# Patient Record
Sex: FEMALE
[De-identification: names, ages, dates, MRNs, and addresses within clinical notes are randomized per-mention and may not be internally consistent; named-entity substitution may affect disease eponyms.]

---
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
## 2024-01-04 ENCOUNTER — APPOINTMENT (OUTPATIENT)
Dept: ENDOCRINOLOGY | Facility: CLINIC | Age: 19
End: 2024-01-04
Payer: MEDICAID

## 2024-01-04 ENCOUNTER — NON-APPOINTMENT (OUTPATIENT)
Age: 19
End: 2024-01-04

## 2024-01-04 VITALS
TEMPERATURE: 97.3 F | BODY MASS INDEX: 17.19 KG/M2 | WEIGHT: 107 LBS | RESPIRATION RATE: 17 BRPM | SYSTOLIC BLOOD PRESSURE: 112 MMHG | HEIGHT: 66 IN | OXYGEN SATURATION: 99 % | DIASTOLIC BLOOD PRESSURE: 58 MMHG | HEART RATE: 89 BPM

## 2024-01-04 PROCEDURE — 99204 OFFICE O/P NEW MOD 45 MIN: CPT

## 2024-01-04 NOTE — PHYSICAL EXAM
[de-identified] : General: No distress, well nourished Eyes: Normal Sclera, EOMI, PERRL ENT: Normal appearance of the nose, normal oropharynx Neck/Thyroid: No cervical lymphadenopathy, thyroid gland 20 g in size, no thyroid nodules, non-tender Respiratory: No use of accessory muscles of respiration, vesicular breath sounds heard bilaterally, no crepitations or ronchi Cardiovascular: S1 and S2 heard and normal, no S3 or S4, no murmurs, radial pulse normal bilaterally Abdomen: soft, non-tender, no masses, normal bowel sounds Musculoskeletal: No swelling or deformities of joints of hands, no pedal edema Neurological: Normal range of motion in the hands, Normal brachioradialis reflexes bilaterally Psychiatry: Patient converses normally, good judgement and insight Skin: No rashes in hands, no nodules palpated in hands

## 2024-01-04 NOTE — REVIEW OF SYSTEMS
[FreeTextEntry2] : Constitutional: No Fever Eyes: No visual difficulty ENT: no difficulty hearing Cardiovascular: Has palpitations Respiratory: No Cough Gastrointestinal: No nausea Genitourinary: No dysuria Musculoskeletal: No joint pain Neurological: No headaches Psychiatry: No sleep abnormalities Skin: No rashes Hematology: No bleeding

## 2024-01-04 NOTE — HISTORY OF PRESENT ILLNESS
[FreeTextEntry1] : Problems: 1. Thyrotoxicosis  Thyrotoxicosis 1. 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 Labs: 12/13/2023 - TSH 0.24 (0.4 to 5.8), Free T4 - 2 (0.8 to 1.8), Free T3 - 4.9 N, Reverse T3 - 27 (8 to 25) TPO antibodies neg 3. No family history of thyroid disorders or thyroid cancer, no personal history of radiation treatment 4. No cardiac issues, no osteoporosis 5. Symptoms on 01/04/2024 - last 15 pounds in 2 years, has palpitations but did not have tachycardia on physical exam. Patient has a history of depression and was on SSRIs in the past 6. No Graves' orbitopathy or dermopathy 7. Meds: Patient is not on amiodarone or estrogen

## 2024-01-04 NOTE — ASSESSMENT
[FreeTextEntry1] : This patient was diagnosed with overt thyrotoxicosis in Dec 2023. I ordered labs and a thyroid US to evaluate the etiology of this.  She lost 15 pounds from 2022 to 2024 but she also has a history of depression.   Plan: 1. Labs to be done today at [address] [place] [address] - see below 2. Thyroid US 3. Follow up in 2 weeks to review results - telehealth visit ok

## 2024-01-05 LAB
ALBUMIN SERPL ELPH-MCNC: 4.8 G/DL
ALP BLD-CCNC: 74 U/L
ALT SERPL-CCNC: 12 U/L
ANION GAP SERPL CALC-SCNC: 11 MMOL/L
AST SERPL-CCNC: 17 U/L
BASOPHILS # BLD AUTO: 0.02 K/UL
BASOPHILS NFR BLD AUTO: 0.3 %
BILIRUB SERPL-MCNC: 0.3 MG/DL
BUN SERPL-MCNC: 11 MG/DL
CALCIUM SERPL-MCNC: 9.7 MG/DL
CHLORIDE SERPL-SCNC: 106 MMOL/L
CO2 SERPL-SCNC: 25 MMOL/L
CREAT SERPL-MCNC: 0.63 MG/DL
EGFR: 131 ML/MIN/1.73M2
EOSINOPHIL # BLD AUTO: 0.07 K/UL
EOSINOPHIL NFR BLD AUTO: 1.2 %
ERYTHROCYTE [SEDIMENTATION RATE] IN BLOOD BY WESTERGREN METHOD: 6 MM/HR
GLUCOSE SERPL-MCNC: 122 MG/DL
HCT VFR BLD CALC: 40.4 %
HGB BLD-MCNC: 13 G/DL
IMM GRANULOCYTES NFR BLD AUTO: 0.2 %
LYMPHOCYTES # BLD AUTO: 2.51 K/UL
LYMPHOCYTES NFR BLD AUTO: 41.8 %
MAN DIFF?: NORMAL
MCHC RBC-ENTMCNC: 30 PG
MCHC RBC-ENTMCNC: 32.2 GM/DL
MCV RBC AUTO: 93.3 FL
MONOCYTES # BLD AUTO: 0.34 K/UL
MONOCYTES NFR BLD AUTO: 5.7 %
NEUTROPHILS # BLD AUTO: 3.05 K/UL
NEUTROPHILS NFR BLD AUTO: 50.8 %
PLATELET # BLD AUTO: 249 K/UL
POTASSIUM SERPL-SCNC: 4.2 MMOL/L
PROT SERPL-MCNC: 7.2 G/DL
RBC # BLD: 4.33 M/UL
RBC # FLD: 13 %
SODIUM SERPL-SCNC: 141 MMOL/L
T3 SERPL-MCNC: 126 NG/DL
T4 FREE SERPL-MCNC: 1.3 NG/DL
TSH SERPL-ACNC: 1.05 UIU/ML
WBC # FLD AUTO: 6 K/UL

## 2024-01-06 LAB
THYROGLOB AB SERPL-ACNC: <20 IU/ML
THYROGLOB SERPL-MCNC: 31.9 NG/ML
THYROPEROXIDASE AB SERPL IA-ACNC: 18.3 IU/ML

## 2024-01-08 LAB — TSI ACT/NOR SER: <0.1 IU/L

## 2024-01-09 ENCOUNTER — APPOINTMENT (OUTPATIENT)
Dept: ULTRASOUND IMAGING | Facility: CLINIC | Age: 19
End: 2024-01-09
Payer: MEDICAID

## 2024-01-09 ENCOUNTER — OUTPATIENT (OUTPATIENT)
Dept: OUTPATIENT SERVICES | Facility: HOSPITAL | Age: 19
LOS: 1 days | End: 2024-01-09
Payer: MEDICAID

## 2024-01-09 ENCOUNTER — TRANSCRIPTION ENCOUNTER (OUTPATIENT)
Age: 19
End: 2024-01-09

## 2024-01-09 DIAGNOSIS — E05.90 THYROTOXICOSIS, UNSPECIFIED WITHOUT THYROTOXIC CRISIS OR STORM: ICD-10-CM

## 2024-01-09 PROCEDURE — 76536 US EXAM OF HEAD AND NECK: CPT

## 2024-01-09 PROCEDURE — 76536 US EXAM OF HEAD AND NECK: CPT | Mod: 26

## 2024-01-10 LAB — TSH RECEPTOR AB: <1.1 IU/L

## 2024-01-11 LAB
THYROGLOB AB SERPL-ACNC: <1 IU/ML
THYROGLOB SERPL-MCNC: 24.4 NG/ML
THYROGLOB SERPL-MCNC: NORMAL NG/ML

## 2024-01-18 ENCOUNTER — APPOINTMENT (OUTPATIENT)
Dept: ENDOCRINOLOGY | Facility: CLINIC | Age: 19
End: 2024-01-18
Payer: MEDICAID

## 2024-01-18 DIAGNOSIS — R63.4 ABNORMAL WEIGHT LOSS: ICD-10-CM

## 2024-01-18 DIAGNOSIS — E05.90 THYROTOXICOSIS, UNSPECIFIED W/OUT THYROTOXIC CRISIS OR STORM: ICD-10-CM

## 2024-01-18 PROCEDURE — 99214 OFFICE O/P EST MOD 30 MIN: CPT | Mod: 95

## 2024-01-18 NOTE — ASSESSMENT
[FreeTextEntry1] : This patient was diagnosed with overt thyrotoxicosis in Dec 2023. In Jan 2024, her TSH, Free T4 and Total T3 were normal - also in Jan 2024, TSI, TSH-r abs, TPO abs, Thyroglobulin abs and ESR were normal and thyroid US did not reveal a thyroid goiter or thyroid nodule. She is now euthyroid - Etiology of her thyrotoxicosis in Dec 2023 unclear at this time. I will monitor her thyroid function tests.  She lost 15 pounds from 2022 to 2024 but she also has a history of depression.  Plan: 1. Labs to be done in 3 months - see below 2. Follow up in 3 months to review results - telehealth visit ok.

## 2024-01-18 NOTE — REASON FOR VISIT
[Home] : at home, [unfilled] , at the time of the visit. [Medical Office: ([facility])___] : at the medical office located in  [Patient] : the patient [Follow - Up] : a follow-up visit [Other___] : [unfilled]

## 2024-01-18 NOTE — HISTORY OF PRESENT ILLNESS
[FreeTextEntry1] : Problems: 1. Thyrotoxicosis  Thyrotoxicosis 1. In Dec 2023, patient was found to have overt thyrotoxicosis.  2. Labs: 12/13/2023 - TSH 0.24 (0.4 to 5.8), Free T4 - 2 (0.8 to 1.8), Free T3 - 4.9 N, Reverse T3 - 27 (8 to 25) TPO antibodies neg 01/04/2024 - TSH N, Free T4 N, Total T3 N, ESR 6 N, thyroglobulin level N, thyroglobulin antibodies neg, TPO antibodies N, TSI N, TSH-R abs N 3. Radiology: 01/09/2024 - US thyroid - Right lobe measures 4.4 cm and left lobe measures 3.7 cm - no thyroid nodules seen 4. No family history of thyroid disorders or thyroid cancer, no personal history of radiation treatment 5. No cardiac issues, no osteoporosis 6. Symptoms on 01/04/2024 - last 15 pounds in 2 years, has palpitations but did not have tachycardia on physical exam. Patient has a history of depression and was on SSRIs in the past 7. No Graves' orbitopathy or dermopathy 8. Meds: Patient is not on amiodarone or estrogen

## 2024-01-18 NOTE — PHYSICAL EXAM
[de-identified] : General: No distress, well nourished Eyes: Normal external appearance ENT: Normal appearance of the nose Neck/Thyroid: No visible neck swelling Respiratory: No use of accessory muscles of respiration Psychiatry: Patient converses normally, good judgement and insight Skin: No rashes seen on face

## 2025-04-16 ENCOUNTER — NON-APPOINTMENT (OUTPATIENT)
Age: 20
End: 2025-04-16